# Patient Record
(demographics unavailable — no encounter records)

---

## 2025-04-22 NOTE — PROCEDURE
[de-identified] : Indication: requirement for exam not possible via anterior rhinoscopy; chronic nasal obstruction After verbal consent and the administration of an aerosolized oxymetazoline/lidocaine mix, examination was performed with a flexible endoscope attached to a video monitoring system. Findings: Septum: wide, severe L NSD Mucosa: normal Polyposis: not present Inferior turbinates: large, pos Afrin test Middle and superior turbinates: normal Inferior meatus: unremarkable Middle meatus: unremarkable Superior meatus: unremarkable Speno-ethmoidal recess: unremarkable Nasopharynx: unremarkable Secretions: unremarkable Other findings: none [de-identified] : Indication: requirement for exam not possible via anterior examination; globus After verbal consent and the administration of an aerosolized oxymetazoline/lidocaine mix, examination was performed with a flexible endoscope placed through the nose which was attached to a video monitoring system. Findings: Nasopharynx: unremarkable Soft palate, lateral and posterior pharyngeal walls: unremarkable Base of tongue & lingual tonsil: moderate retrodisplacement Vallecula: unremarkable Epiglottis: unremarkable Piriform sinuses and pharyngoesophageal junction: unremarkable Arytenoids and AE folds: mild interarytenoid edema Ventricle and false vocal folds: unremarkable True vocal folds: Smooth free edge; surface without ectasias or edema; normal movement bilaterally with good apposition in phonation Visible subglottis: unremarkable Narrow-band imaging: not used Other findings: none

## 2025-04-22 NOTE — ASSESSMENT
[FreeTextEntry1] : HSAT & RTC  Trial fluticasone but we discussed a likely septo/turbs  We discussed allergen mitigation and provided the patient with the corresponding educational handout; reviewed proper nasal steroid administration technique.

## 2025-04-22 NOTE — PHYSICAL EXAM
[Nasal Endoscopy Performed] : nasal endoscopy was performed, see procedure section for findings [] : septum deviated to the left [Laryngoscopy Performed] : laryngoscopy was performed, see procedure section for findings [Normal] : no masses and lesions seen, face is symmetric [de-identified] :  Large inferior turbinates; positive Afrin test [de-identified] : 1-2+

## 2025-04-22 NOTE — HISTORY OF PRESENT ILLNESS
[de-identified] : Chronic difficulty breathing through his nose; breath-rite strips help. Cat & dust allergy; he has a cat. No frequent sinus infections. No trial of meds except one-off uses of Afrin.  Snoring and his wife says that she sees apneas; no morning headaches or daytime sleepiness. No hx htn.